# Patient Record
Sex: FEMALE | Race: WHITE | NOT HISPANIC OR LATINO | URBAN - METROPOLITAN AREA
[De-identification: names, ages, dates, MRNs, and addresses within clinical notes are randomized per-mention and may not be internally consistent; named-entity substitution may affect disease eponyms.]

---

## 2018-12-04 ENCOUNTER — NEW PATIENT (OUTPATIENT)
Dept: URBAN - METROPOLITAN AREA CLINIC 60 | Facility: CLINIC | Age: 73
End: 2018-12-04
Payer: COMMERCIAL

## 2018-12-04 DIAGNOSIS — Z96.1 PRESENCE OF INTRAOCULAR LENS: ICD-10-CM

## 2018-12-04 DIAGNOSIS — H43.812 VITREOUS DEGENERATION, LEFT EYE: Primary | ICD-10-CM

## 2018-12-04 PROCEDURE — 92004 COMPRE OPH EXAM NEW PT 1/>: CPT | Performed by: OPTOMETRIST

## 2018-12-04 ASSESSMENT — INTRAOCULAR PRESSURE
OD: 13
OS: 13

## 2019-07-11 ENCOUNTER — HOSPITAL ENCOUNTER (EMERGENCY)
Dept: HOSPITAL 59 - ER | Age: 74
Discharge: HOME | End: 2019-07-11
Payer: MEDICARE

## 2019-07-11 DIAGNOSIS — M75.102: Primary | ICD-10-CM

## 2019-07-11 DIAGNOSIS — Z79.84: ICD-10-CM

## 2019-07-11 DIAGNOSIS — I10: ICD-10-CM

## 2019-07-11 DIAGNOSIS — E11.9: ICD-10-CM

## 2019-07-11 PROCEDURE — 99283 EMERGENCY DEPT VISIT LOW MDM: CPT

## 2019-07-11 NOTE — EMERGENCY DEPARTMENT RECORD
History of Present Illness





- General


Chief complaint: Extremity Problem


Stated complaint: LT ARM PAIN


Time Seen by Provider: 19 14:56


Source: Patient


Mode of Arrival: Ambulatory


Limitations: No limitations





- History of Present Illness


Initial comments: 





74 yo female presents with about a week of left shoulder pain.  She denies any 

specific injury.  She has pain with lift or abduction.  No abnormal swelling, 

warmth, redness or rash.  No weakness.  She had similar pain in the spring that 

went away on its own.  No shoulder trauma or surgery history.  No weakness. 


MD Complaint: Extremity pain, Joint pain


Onset/Timin


-: Week(s)


Location: Left, Arm


History of Same: Yes


-: Yes Arthralgia, Yes Myalgia


Radiation: Proximal


Severity scale (1-10): 7


Quality: Aching


Consistency: Constant


Improves with: Immobilization


Worsens with: Exertion


Associated Symptoms: Arthralgias





- Related Data


                                Home Medications











 Medication  Instructions  Recorded  Confirmed  Last Taken


 


Losartan Potassium [Cozaar] 100 mg PO DAILY 19


 


Metformin HCl [Glucophage] 500 mg PO BID 19


 


Omeprazole [Prilosec] 20 mg PO DAILY 19








                                  Previous Rx's











 Medication  Instructions  Recorded


 


Methylprednisolone [Medrol Dose 4 mg PO DAILY #1 tab.ds.pk 19





Pack]  











                                    Allergies











Allergy/AdvReac Type Severity Reaction Status Date / Time


 


Sulfa (Sulfonamide Allergy  HIVES Verified 19 14:35





Antibiotics)     














Travel Screening





- Travel/Exposure Within Last 30 Days


Have you traveled within the last 30 days?: No





- Travel/Exposure Within Last Year


Have you traveled outside the [place] in the last year?: No





- Additonal Travel Details


Have you been exposed to anyone with a communicable illness?: No





- Travel Symptoms


Symptom Screening: None





Review of Systems


Constitutional: Denies: Chills, Fever, Malaise, Weakness


Eyes: Denies: Eye discharge, Eye pain, Vision change


ENT: Denies: Congestion, Throat pain


Respiratory: Denies: Cough, Dyspnea


Cardiovascular: Denies: Chest pain, Palpitations, Syncope


Endocrine: Denies: Fatigue, Polydipsia, Polyuria


Gastrointestinal: Denies: Abdominal pain, Diarrhea, Nausea, Vomiting


Genitourinary: Denies: Dysuria, Urgency


Musculoskeletal: Reports: Arthralgia, Myalgia


Skin: Denies: Bruising, Change in color, Rash


Neurological: Denies: Headache, Numbness, Tingling, Weakness


Psychiatric: Denies: Anxiety


Hematological/Lymphatic: Denies: Easy bleeding, Easy bruising





Past Medical History





- SOCIAL HISTORY


Smoking Status: Never smoker


Alcohol Use: Rare


Drug Use: None





- RESPIRATORY


Hx Respiratory Disorders: No





- CARDIOVASCULAR


Hx Cardio Disorders: Yes


Hx Hypertension: Yes





- NEURO


Hx Neuro Disorders: No





- GI


Hx GI Disorders: Yes


Hx Reflux: Yes





- 


Hx Genitourinary Disorders: Yes


Comment:: partial nephrectomy





- ENDOCRINE


Hx Endocrine Disorders: Yes


Hx Diabetes: Yes (type 2)





- MUSCULOSKELETAL


Hx Musculoskeletal Disorders: Yes


Hx Arthritis: Yes





- PSYCH


Hx Psych Problems: No





- HEMATOLOGY/ONCOLOGY


Hx Hematology/Oncology Disorders: Yes


Comment:: kidney





Family Medical History


Any Significant Family History?: Yes


Hx Diabetes: Father


Hx Heart Disease: Mother


Hx Resp Disorders: Mother





Physical Exam





- General


General Appearance: Alert, Oriented x3, Cooperative, No acute distress


Limitations: No limitations





- Head


Head exam: Atraumatic, Normal inspection





- Eye


Eye exam: Normal appearance, PERRL.  negative: Conjunctival injection, Scleral 

icterus





- ENT


ENT exam: Normal exam, Mucous membranes moist


Ear exam: Normal external inspection


Nasal Exam: Normal inspection


Mouth exam: Normal external inspection





- Neck


Neck exam: Normal inspection, Full ROM.  negative: Lymphadenopathy, Tenderness





- Respiratory


Respiratory exam: Normal lung sounds bilaterally.  negative: Rhonchi, Stridor, 

Wheezes





- Cardiovascular


Cardiovascular Exam: Regular rate, Normal rhythm, Normal heart sounds


Peripheral Pulses: 2+: Radial (L)





- Rectal


Rectal exam: Deferred





- 


 exam: Deferred





- Extremities


Extremities exam: Normal inspection, Tenderness.  negative: Full ROM, Joint 

swelling, Pedal edema


Image of Full Body: 


                            __________________________














                            __________________________





 1 - tender proximal, laterally, pain with adduction, not significant pain with 

internal or external rotation, no swelling, biceps intact,  intact








- Back


Back exam: Reports: Normal inspection.  Denies: CVA tenderness (R), CVA 

tenderness (L)





- Neurological


Neurological exam: Alert, Oriented X3, Other ( initact, biceps intact).  

negative: Motor sensory deficit





- Psychiatric


Psychiatric exam: Normal affect, Normal mood





- Skin


Skin exam: Dry, Intact, Normal color, Warm





Course





                                   Vital Signs











  19





  14:38


 


Temperature 97.8 F


 


Pulse Rate 86


 


Respiratory 16





Rate 


 


Blood Pressure 127/86


 


Pulse Ox 97














- Reevaluation(s)


Reevaluation #1: 





19 15:33


The XR was reviewed


No acute bony abnormality


The examination is consistent with possible tendonitis


We discussed home care, reasons to return and follow up with her PCP in [place]





Disposition


Disposition: Discharge


Clinical Impression: 


 Tendonitis





Disposition: Home, Self-Care


Condition: (1) Good


Instructions:  Rotator Cuff Tendinitis (ED)


Additional Instructions: 


Call your doctor for the next available follow up appointment


Review this ER visit and the tests performed with your family doctor


Return to the ER for a recheck if worse, any new concerns or questions


Take the prescriptions provided as directed


Prescriptions: 


Methylprednisolone [Medrol Dose Pack] 4 mg PO DAILY #1 tab.ds.pk


Forms:  Patient Portal Access


Time of Disposition: 15:36





Quality





- Quality Measures


Quality Measures: N/A





- Blood Pressure Screening


Does Patient Have Any of the Following: Active Dx of HTN


Blood Pressure Classification: Pre-Hypertensive BP Reading


Systolic Measurement: 132


Diastolic Measurement: 77


Screening for High Blood Pressure: Patient Exclusion, Hx of HTN []

## 2019-07-14 NOTE — RADIOLOGY REPORT
EXAM:  HUMERUS, LEFT



HISTORY:  LEFT ARM PAIN SUDDEN ONSET. NO KNOWN INJURY.



TECHNIQUE:  Left humerus, two views.



COMPARISON:  None.



ENCOUNTER:  Initial.



FINDINGS:  No acute osseous abnormality is identified. 



IMPRESSION:  

NEGATIVE EXAM. MRI COULD BETTER ASSESS THE SOFT TISSUES AND BONE MARROW.



JOB NUMBER:  [id number]

MTDD

## 2019-08-21 ENCOUNTER — HOSPITAL ENCOUNTER (EMERGENCY)
Dept: HOSPITAL 59 - ER | Age: 74
Discharge: HOME | End: 2019-08-21
Payer: MEDICARE

## 2019-08-21 DIAGNOSIS — K52.9: ICD-10-CM

## 2019-08-21 DIAGNOSIS — I10: ICD-10-CM

## 2019-08-21 DIAGNOSIS — Z85.528: ICD-10-CM

## 2019-08-21 DIAGNOSIS — K57.92: Primary | ICD-10-CM

## 2019-08-21 DIAGNOSIS — E11.9: ICD-10-CM

## 2019-08-21 DIAGNOSIS — K57.90: ICD-10-CM

## 2019-08-21 LAB
ABSOLUTE NEUTROPHIL COUNT: 3.4
ALBUMIN SERPL-MCNC: 4.5 G/DL (ref 4–5)
ALP SERPL-CCNC: 132 U/L (ref 35–104)
ALT SERPL-CCNC: 15 U/L (ref ?–33)
ANION GAP SERPL CALC-SCNC: 13 MMOL/L (ref 7–16)
APPEARANCE UR: CLEAR
AST SERPL-CCNC: 15 U/L (ref 10–35)
BASOPHILS NFR BLD: 0.5 % (ref 0–6)
BILIRUB DIRECT SERPL-MCNC: < 0.2 MG/DL (ref 0–0.3)
BILIRUB SERPL-MCNC: 0.4 MG/DL (ref 0.2–1)
BILIRUB UR-MCNC: NEGATIVE MG/DL
BUN SERPL-MCNC: 16 MG/DL (ref 8–23)
CO2 SERPL-SCNC: 25 MMOL/L (ref 22–29)
COLOR UR: YELLOW
CREAT SERPL-MCNC: 0.7 MG/DL (ref 0.5–0.9)
EOSINOPHIL NFR BLD: 4.1 % (ref 0–6)
EPI CELLS #/AREA URNS HPF: (no result) /[HPF]
ERYTHROCYTE [DISTWIDTH] IN BLOOD BY AUTOMATED COUNT: 12.2 % (ref 11.5–14.5)
EST GLOMERULAR FILTRATION RATE: > 60 ML/MIN
GLUCOSE SERPL-MCNC: 102 MG/DL (ref 74–109)
GLUCOSE UR STRIP-MCNC: NEGATIVE MG/DL
GRANULOCYTES NFR BLD: 58.7 % (ref 47–80)
HCT VFR BLD CALC: 42.6 % (ref 35–47)
HGB BLD-MCNC: 13.9 GM/DL (ref 11.6–16)
KETONES UR QL STRIP: NEGATIVE
LIPASE SERPL-CCNC: 66 U/L (ref 13–60)
LYMPHOCYTES NFR BLD AUTO: 27.2 % (ref 16–45)
MCH RBC QN AUTO: 29.6 PG (ref 27–33)
MCHC RBC AUTO-ENTMCNC: 32.6 G/DL (ref 32–36)
MCV RBC AUTO: 90.8 FL (ref 81–97)
MONOCYTES NFR BLD: 9.5 % (ref 0–9)
NITRITE UR QL STRIP: NEGATIVE
PLATELET # BLD: 312 K/UL (ref 130–400)
PMV BLD AUTO: 8.9 FL (ref 7.4–10.4)
PROT SERPL-MCNC: 7.4 G/DL (ref 6.6–8.7)
PROT UR QL STRIP: NEGATIVE
RBC # BLD AUTO: 4.69 M/UL (ref 3.8–5.4)
RBC # UR STRIP: NEGATIVE /UL
RBC #/AREA URNS HPF: (no result) /[HPF]
URINE LEUKOCYTE ESTERASE: (no result)
UROBILINOGEN UR STRIP-ACNC: 0.2 E.U./DL (ref 0.2–1)
WBC # BLD AUTO: 5.8 K/UL (ref 4.2–12.2)

## 2019-08-21 PROCEDURE — 83690 ASSAY OF LIPASE: CPT

## 2019-08-21 PROCEDURE — 81001 URINALYSIS AUTO W/SCOPE: CPT

## 2019-08-21 PROCEDURE — 85025 COMPLETE CBC W/AUTO DIFF WBC: CPT

## 2019-08-21 PROCEDURE — 99284 EMERGENCY DEPT VISIT MOD MDM: CPT

## 2019-08-21 PROCEDURE — 80076 HEPATIC FUNCTION PANEL: CPT

## 2019-08-21 PROCEDURE — 80048 BASIC METABOLIC PNL TOTAL CA: CPT

## 2019-08-21 PROCEDURE — 96374 THER/PROPH/DIAG INJ IV PUSH: CPT

## 2019-08-21 PROCEDURE — 74176 CT ABD & PELVIS W/O CONTRAST: CPT

## 2019-08-21 NOTE — EMERGENCY DEPARTMENT RECORD
History of Present Illness





- General


Chief Complaint: Abdominal Pain


Stated Complaint: ABD PAIN


Time Seen by Provider: 19 11:40


Source: Patient, RN notes reviewed


Mode of Arrival: Ambulatory





- History of Present Illness


Initial Comments: 


LLQ abd pain and five years ago left kidney cancer and one third removed. 

hysterectomy ,Tand A. appendectomy, GB removal. history of collitis and 

diverticulitis. visiting from [place] and with her daughter here in ER. 





Onset/Timin


-: Week(s)


Location: LLQ


Severity: Mild


Severity scale (1-10): 2


Quality: Cramping, Other


Consistency: Intermittent


Improves With: Nothing


Worsens With: Nothing


Associated Symptoms: Denies other symptoms





- Related Data


                                  Previous Rx's











 Medication  Instructions  Recorded


 


Methylprednisolone [Medrol Dose 4 mg PO DAILY #1 tab.ds.pk 19





Pack]  


 


Ciprofloxacin HCl [Cipro] 500 mg PO Q12HR #20 tablet 19


 


Dicyclomine HCl [Bentyl] 10 mg PO Q8H #20 cap 19


 


Metronidazole [Flagyl] 500 mg PO Q8HR #30 tablet 19











                                    Allergies











Allergy/AdvReac Type Severity Reaction Status Date / Time


 


Sulfa (Sulfonamide Allergy  HIVES Verified 19 11:38





Antibiotics)     














Travel Screening





- Travel/Exposure Within Last 30 Days


Have you traveled within the last 30 days?: No





- Travel/Exposure Within Last Year


Have you traveled outside the [place] in the last year?: Yes


Location Detail:: [place]





- Additonal Travel Details


Have you been exposed to anyone with a communicable illness?: No





- Travel Symptoms


Symptom Screening: None





Review of Systems


Reviewed: No additional complaints except as noted below


Constitutional: Reports: As per HPI.  Denies: Chills, Fever, Malaise, Night 

sweats, Weakness, Weight change


Eyes: Reports: As per HPI.  Denies: Eye discharge, Eye pain, Photophobia, Vision

 change


ENT: Reports: As per HPI.  Denies: Congestion, Dental pain, Ear pain, Epistaxis,

 Hearing loss, Throat pain


Respiratory: Reports: As per HPI.  Denies: Cough, Dyspnea, Hemoptysis, Stridor, 

Wheezes


Cardiovascular: Reports: As per HPI.  Denies: Arrhythmia, Chest pain, Dyspnea on

 exertion, Edema, Murmurs, Orthopnea, Palpitations, Paroxysmal nocturnal 

dyspnea, Rheumatic Fever, Syncope


Endocrine: Reports: As per HPI.  Denies: Fatigue, Heat or cold intolerance, 

Polydipsia, Polyuria


Gastrointestinal: Reports: As per HPI, Abdominal pain.  Denies: Constipation, 

Diarrhea, Hematemesis, Hematochezia, Melena, Nausea, Vomiting


Genitourinary: Reports: As per HPI.  Denies: Abnormal menses, Discharge, 

Dyspareunia, Dysuria, Frequency, Hematuria, Incontinence, Retention, Urgency


Musculoskeletal: Reports: As per HPI.  Denies: Arthralgia, Back pain, Gout, 

Joint swelling, Myalgia, Neck pain


Skin: Reports: As per HPI.  Denies: Bruising, Change in color, Change in 

hair/nails, Lesions, Pruritus, Rash


Neurological: Reports: As per HPI.  Denies: Abnormal gait, Confusion, Headache, 

Numbness, Paresthesias, Seizure, Tingling, Tremors, Vertigo, Weakness


Psychiatric: Reports: As per HPI.  Denies: Anxiety, Auditory hallucinations, 

Depression, Homicidal thoughts, Suicidal thoughts, Visual hallucinations


Hematological/Lymphatic: Reports: As per HPI.  Denies: Anemia, Blood Clots, Easy

 bleeding, Easy bruising, Swollen glands





Past Medical History





- SOCIAL HISTORY


Smoking Status: Never smoker


Alcohol Use: Rare


Drug Use: None





- RESPIRATORY


Hx Respiratory Disorders: No





- CARDIOVASCULAR


Hx Cardio Disorders: Yes


Hx Hypertension: Yes





- NEURO


Hx Neuro Disorders: No





- GI


Hx GI Disorders: Yes


Hx Reflux: Yes





- 


Hx Genitourinary Disorders: Yes


Comment:: partial nephrectomy





- ENDOCRINE


Hx Endocrine Disorders: Yes


Hx Diabetes: Yes (type 2)





- MUSCULOSKELETAL


Hx Musculoskeletal Disorders: Yes


Hx Arthritis: Yes





- PSYCH


Hx Psych Problems: No





- HEMATOLOGY/ONCOLOGY


Hx Hematology/Oncology Disorders: Yes


Comment:: kidney





Family Medical History


Any Significant Family History?: Yes


Hx Diabetes: Father


Hx Heart Disease: Mother


Hx Resp Disorders: Mother





Physical Exam





- General


General Appearance: Alert, Oriented x3, Cooperative, No acute distress





- Head


Head exam: Normal inspection





- Eye


Eye exam: Normal appearance, PERRL


Pupils: Normal accommodation





- ENT


ENT exam: Normal exam, Mucous membranes moist, Normal external ear exam, Normal 

orophraynx, TM's normal bilaterally


Ear exam: Normal external inspection.  negative: External canal tenderness


Nasal Exam: Normal inspection.  negative: Discharge, Sinus tenderness


Mouth exam: Normal external inspection, Tongue normal


Teeth exam: Normal inspection.  negative: Dental caries


Throat exam: Normal inspection.  negative: Tonsillar erythema, Tonsillar exudate





- Neck


Neck exam: Normal inspection, Full ROM.  negative: Tenderness





- Respiratory


Respiratory exam: Normal lung sounds bilaterally.  negative: Respiratory 

distress





- Cardiovascular


Cardiovascular Exam: Regular rate, Normal rhythm, Normal heart sounds





- GI/Abdominal


GI/Abdominal exam: Soft, Normal bowel sounds, Tenderness (left sided abdominal 

pain)





- Rectal


Rectal exam: Deferred





- 


 exam: Deferred





- Extremities


Extremities exam: Normal inspection, Full ROM, Normal capillary refill.  

negative: Tenderness





- Back


Back exam: Reports: Normal inspection, Full ROM.  Denies: Muscle spasm, Rash 

noted, Tenderness





- Neurological


Neurological exam: Alert, Normal gait, Oriented X3, Reflexes normal





- Psychiatric


Psychiatric exam: Normal affect, Normal mood





- Skin


Skin exam: Dry, Intact, Normal color, Warm





Course





                                   Vital Signs











  19





  11:40


 


Temperature 97.7 F


 


Pulse Rate [ 107 H





Right] 


 


Respiratory 19





Rate 


 


Blood Pressure 161/91





[Left Arm] 


 


Pulse Ox 98














- Reevaluation(s)


Reevaluation #1: 


patient is feeling some better and will discharge on cipro and flagyl for 

diverticulitis and will give one dose of solumedrol 40 mg for her colitis but 

don't want to give her oral prednisone because her sugar will go up. If her abd 

pain gets may need to add that on.


19 13:21








Medical Decision Making





- Data Complexity


MDM Data: Labs Ordered and/or Reviewed, X-Ray Ordered and/or Reviewed (stool 

present ,diverticulosis and mild collitis possible)





- Lab Data


Result diagrams: 


                                 19 12:20





                                 19 12:20





Disposition


Clinical Impression: 


 Diverticulitis, Colitis





Disposition: Home, Self-Care


Condition: (1) Good


Instructions:  Diverticulitis (ED), Colitis (ED)


Additional Instructions: 


follow up with family Dr in one week


low fiber diet





Prescriptions: 


Metronidazole [Flagyl] 500 mg PO Q8HR #30 tablet


Ciprofloxacin HCl [Cipro] 500 mg PO Q12HR #20 tablet


Dicyclomine HCl [Bentyl] 10 mg PO Q8H #20 cap


Forms:  Patient Portal Access


Time of Disposition: 13:23





Quality





- Quality Measures


Quality Measures: N/A





- Blood Pressure Screening


Does Patient Have Any of the Following: No, Active Dx of HTN


Blood Pressure Classification: Hypertensive Reading


Systolic Measurement: 149


Diastolic Measurement: 95


Screening for High Blood Pressure: Patient Exclusion, Hx of HTN []

## 2019-08-22 NOTE — CT SCAN REPORT
EXAM:  CT OF THE ABDOMEN AND PELVIS WITHOUT CONTRAST 



HISTORY:  LOW ABDOMINAL PAIN.  NAUSEA, VOMITING AND DIARRHEA.  PRIOR HISTORY OF 
DIVERTICULITIS.  PRIOR APPENDECTOMY, HYSTERECTOMY, CHOLECYSTECTOMY, AND PARTIAL 
LEFT NEPHRECTOMY.  RENAL CARCINOMA.  



TECHNIQUE:  Helical CT examination of the abdomen and pelvis was performed 
without oral or intravenous contrast administration.  Lack of oral and IV 
contrast utilization limits evaluation of the bowel and solid viscera 
respectively.  



Comparison:  None.  



FINDINGS:  Mild linear scarring versus atelectasis is scattered in each lung 
base.  No lung base consolidation, pleural effusion, or pericardial effusion.  
The heart is not enlarged.  



No focal abnormality is demonstrated within the liver, spleen, pancreas, adrenal
glands nor right kidney.  There is an area of scarring within the posterior mid 
to lower left kidney consistent with stated history of partial nephrectomy.  No 
definite left renal mass.  No obstructive uropathy.  



The gallbladder is surgically absent.  No intrahepatic biliary ductal 
dilatation.  There is minor prominence of the common hepatic/common bile duct 
without an obstructing lesion.  This likely is a physiologic response to 
surgical absence of the gallbladder.  No intraabdominal nor retroperitoneal 
lymphadenopathy.  Mild diffuse atherosclerosis without aneurysmal dilatation of 
the abdominal aorta nor iliac arteries.  



The uterus is surgically absent.  No pelvic mass nor adenopathy.  Evaluation of 
the urinary bladder is limited by lack of distention.  



No evidence of bowel obstruction.  



There is a moderate amount of stool within the proximal and mid portions of the 
colon.  There is mild diverticulosis scattered throughout the mid and distal 
portions of the colon less pronounced in the sigmoid region.  There is apparent 
mild wall thickening of the distal descending and proximal sigmoid portions of 
the colon probably due to incomplete distention though colitis would be 
difficult to exclude.  



No ascites nor pneumoperitoneum.  



There is a lobulated fat filled ventral wall hernia superior to the umbilicus.  
This measures 5 x 2.5 x 6 cm.  



No lytic or blastic bone lesion.  There are mild degenerative changes scattered 
within the visualized spine most pronounced in the lumbosacral junction.  



IMPRESSION:  

1.  LACK OF ORAL AND IV CONTRAST UTILIZATION LIMITS EVALUATION OF THE BOWEL.  



2.  COLONIC DIVERTICULOSIS.  NO DEFINITE DIVERTICULITIS THOUGH THERE IS APPARENT
MILD WALL THICKENING OF THE DISTAL DESCENDING AND PROXIMAL SIGMOID PORTIONS OF 
THE COLON.  THIS MAY JUST RELATE TO INCOMPLETE DISTENTION THOUGH MILD COLITIS 
WOULD BE DIFFICULT TO EXCLUDE.  



3.  STATUS POST CHOLECYSTECTOMY, APPENDECTOMY, PARTIAL LEFT NEPHRECTOMY AND 
HYSTERECTOMY.  



4.  FAT FILLED VENTRAL WALL HERNIA SUPERIOR TO THE UMBILICUS APPEARING 
UNCOMPLICATED.  



JOB NUMBER:  [id number]

[facility]